# Patient Record
Sex: MALE | Race: WHITE
[De-identification: names, ages, dates, MRNs, and addresses within clinical notes are randomized per-mention and may not be internally consistent; named-entity substitution may affect disease eponyms.]

---

## 2018-08-24 ENCOUNTER — HOSPITAL ENCOUNTER (OUTPATIENT)
Dept: HOSPITAL 62 - RAD | Age: 33
End: 2018-08-24
Attending: ORTHOPAEDIC SURGERY
Payer: OTHER GOVERNMENT
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
## 2018-08-24 DIAGNOSIS — S43.491A: Primary | ICD-10-CM

## 2018-08-24 PROCEDURE — 73222 MRI JOINT UPR EXTREM W/DYE: CPT

## 2018-08-24 PROCEDURE — 77002 NEEDLE LOCALIZATION BY XRAY: CPT

## 2018-08-24 PROCEDURE — 23350 INJECTION FOR SHOULDER X-RAY: CPT

## 2018-08-24 PROCEDURE — A9576 INJ PROHANCE MULTIPACK: HCPCS

## 2018-08-24 NOTE — RADIOLOGY REPORT (SQ)
EXAM DESCRIPTION:  MRI RT UPPER JOINT WITH



COMPLETED DATE/TIME:  8/24/2018 10:01 am



REASON FOR STUDY:  SPRAIN OF RIGHT SHOULDER S43.491A  OTHER SPRAIN OF RIGHT SHOULDER JOINT, INITIAL E
NCOU



COMPARISON:  None.



TECHNIQUE:  Right shoulder images acquired and stored on PACS. Oblique coronal, oblique sagittal, and
 axial imaging to include fat sensitive sequences as T1, water sensitive sequences as FST2/STIR, and 
contrast sensitive sequences as FST1.



LIMITATIONS:  Motion.



FINDINGS:  JOINT DISTENTION: Adequate distention for interpretation.  No contrast in the subacromial 
bursa.

BONE MARROW AND CORTEX: Normal. No significant osteophytes. No edema or defects.

AC JOINT: Type II acromion. No significant AC joint arthropathy.

GLENOHUMERAL JOINT: Small subchondral cyst anterior inferior glenoid.

ROTATOR CUFF: Intact without significant tendinopathy, partial or full-thickness tears. No peritendin
itis.

LABRUM AND BICEPS LABRAL COMPLEX: There is fraying of the superior labrum.  No significant extension 
into the biceps anchor.  Distal biceps intact.

INFERIOR LABRAL COMPLEX: Bony glenoid and labrum intact. IGHL intact without thickening or tear. No p
aralabral cysts.

ADJACENT SOFT TISSUES: No masses or nodes.

OTHER: No other significant finding.



IMPRESSION:  Type 1 slap tear without significant extension into the biceps anchor.



TECHNICAL DOCUMENTATION:  JOB ID:  [id number]

 2011 [employer]- All Rights Reserved



Reading location - IP/workstation name: [email or web address]

## 2018-08-24 NOTE — RADIOLOGY REPORT (SQ)
EXAM DESCRIPTION:  ARTHRO SHOULDER INJECTION; FLUORO/NEEDLE PLACEMENT



COMPLETED DATE/TIME:  8/24/2018 9:25 am



REASON FOR STUDY:  SPRAIN OF RIGHT SHOULDER S43.491A  OTHER SPRAIN OF RIGHT SHOULDER JOINT, INITIAL E
NCOU



COMPARISON:  None.



FLUOROSCOPY TIME:  10 seconds

1 images saved to PACS.



LIMITATIONS:  None.



PROCEDURE:  Procedure, risks, benefits and alternatives explained to patient who then gave written co
nsent. The right shoulder was marked and a time out was called for correct procedure verification.  P
osterior entry site marked using fluoroscopic guidance.  Shoulder prepped and draped using sterile te
chnique.  Local anesthesia achieved using 1% lidocaine injection.  Hypodermic needle introduced into 
the joint space under direct fluoroscopic visualization. Non-ionic contrast instilled to confirm intr
a-articular position. Dilute gadolinium solution then injected.  Needle removed and entry site covere
d with sterile bandage. No immediate complications noted.



TECHNIQUE:  Digital images acquired during fluoroscopy and stored on PACS.   Patient immediately take
n to the MR suite for additional imaging.

INJECTION LOCATION: Posterior right shoulder.

CONTRAST TYPE AND AMOUNT: 1 cc Isovue 10 cc Prohance/Saline mixture.



IMPRESSION:  SUCCESSFUL NEEDLE PLACEMENT AND INJECTION FOR RIGHT SHOULDER MR ARTHROGRAM USING POSTERI
OR APPROACH.



COMMENT:  Quality :  Final reports for procedures using fluoroscopy that document radiation exp
osure indices, or exposure time and number of fluorographic images (if radiation exposure indices are
 not available)



TECHNICAL DOCUMENTATION:  JOB ID:  [id number]

 2011 [employer]- All Rights Reserved



Reading location - IP/workstation name: [email or web address]